# Patient Record
(demographics unavailable — no encounter records)

---

## 2024-11-04 NOTE — PHYSICAL EXAM
[Normal] : mucosa is normal [Midline] : trachea located in midline position [FreeTextEntry1] : obese [de-identified] : CI AU [de-identified] : enlarged, dry mucosa

## 2024-11-04 NOTE — HISTORY OF PRESENT ILLNESS
[de-identified] : 67 yr old female with another episode of throat clearing since mid-October, gradually improving doesn't recall dietary indiscretion   had a similar episode in May assoc with laryngitis +heartburn if she eats spicy food and lies down  +right sided epistaxis, most recently a few days ago   +DAHLIA on auto PAP 6-16cm H20  compliance report reviewed  allergy eval 6/2023 WNL

## 2024-11-04 NOTE — ASSESSMENT
[FreeTextEntry1] : NS for epistaxis  pt is using and benefitting from auto-PAP  diet/lifestyle instructions given to pt rx omeprazole if symptoms recur f/u if symptoms recur

## 2024-12-02 NOTE — PHYSICAL EXAM
[Normal] : mucosa is normal [Midline] : trachea located in midline position [FreeTextEntry1] : obese [de-identified] : CI AU [de-identified] : enlarged, dry mucosa

## 2024-12-02 NOTE — HISTORY OF PRESENT ILLNESS
[de-identified] : 67 yr old female with another episode of throat clearing since mid-October doesn't recall dietary indiscretion   had a similar episode in May assoc with laryngitis +heartburn if she eats spicy food and lies down 11/4 rx omeprazole qd some improvement in symptoms since then

## 2025-02-03 NOTE — PHYSICAL EXAM
[Normal] : mucosa is normal [Midline] : trachea located in midline position [FreeTextEntry1] : obese [de-identified] : CI AU [de-identified] : enlarged, dry mucosa

## 2025-02-03 NOTE — HISTORY OF PRESENT ILLNESS
[de-identified] : 68 yr old female with another episode of throat clearing since mid-October doesn't recall dietary indiscretion   had a similar episode in May assoc with laryngitis +heartburn if she eats spicy food and lies down 11/4 rx omeprazole qd and famotidine qhs some improvement in symptoms since then with much less throat clearing  is trying to be careful about diet/lifestyle still has a cup of coffee in the morning and a glass of wine when out for dinner

## 2025-04-01 NOTE — ADDENDUM
[FreeTextEntry1] :  Documented by Kam Gillespie acting as scribe for Dr. Montes De Oca on 04/01/2025. All Medical record entries made by the Scribe were at my, Dr. Montes De Oca, direction and personally dictated by me on 04/01/2025 . I have reviewed the chart and agree that the record accurately reflects my personal performance of the history, physical exam, assessment and plan. I have also personally directed, reviewed, and agreed with the discharge instructions.

## 2025-04-01 NOTE — HISTORY OF PRESENT ILLNESS
[de-identified] : Patient with h/o DAHLIA- uses CPAP, right-sided epistaxis, chronic rhinitis, chronic bilateral OE, vertigo, and GERD presents today for a follow up. She states that she has a sore in her right nose intermittently. She states that she had improvement in her throat clearing with Famotidine BID and a reflux diet. She states that she lost her voice in the past from the throat clearing. She states that she has been using her CPAP machine.

## 2025-04-01 NOTE — CONSULT LETTER
[Dear  ___] : Dear  [unfilled], [Courtesy Letter:] : I had the pleasure of seeing your patient, [unfilled], in my office today. [Please see my note below.] : Please see my note below. [Consult Closing:] : Thank you very much for allowing me to participate in the care of this patient.  If you have any questions, please do not hesitate to contact me. [Sincerely,] : Sincerely, [FreeTextEntry3] :  Adrian Montes De Oca MD FACS

## 2025-04-01 NOTE — PHYSICAL EXAM
[Hearing [place] Test (Tuning Fork On Forehead)] : no lateralization of tone [] : septum deviated bilaterally [Midline] : trachea located in midline position [Normal] : no rashes [Hearing Loss Right Only] : normal [Hearing Loss Left Only] : normal [FreeTextEntry8] : cerumen removed via curettage [FreeTextEntry9] : cerumen removed via curettage [de-identified] : mildly inflamed turbinates [de-identified] : slight oral PND. type 3 oral cavity. erythema of the posterior pharyngeal wall [FreeTextEntry2] : sinuses nontender to percussion. sensations intact

## 2025-04-01 NOTE — PROCEDURE
[Recalcitrant Symptoms] : recalcitrant symptoms  [Flexible Endoscope] : examined with the flexible endoscope [de-identified] : Dr. Montes De Oca [de-identified] : Chronic rhinitis [FreeTextEntry6] :  Procedure: Flexible Nasal Endoscopy with a Look at the Larynx: Risks, benefits, and alternatives of flexible endoscopy were explained to the patient. The patient gave oral consent to proceed. The flexible scope was inserted into the right nasal cavity. Endoscopy of the inferior and middle meatus was performed. No polyp, mass, or lesion was appreciated on the left, but raised red lesion in the floor of the right nose. Olfactory cleft was clear. Spheno-ethmoid recess clear. Nasopharynx was clear. Turbinates were without mass, but large right middle and inferior turbinate. There was a minimally deviated septum. Oropharyngeal walls were symmetric and mobile without lesion, mass, or edema. Hypopharynx was also without lesion or edema. Larynx was mobile without lesions. Supraglottic structures were free of mass and asymmetry, but edema of the postcricoid, arytenoids and interarytenoids. True vocal folds were white without mass or lesion, but the right vocal cord is thicker than the left vocal cord. Base of tongue was within normal limits. -large right middle and inferior turbinate -no polyps, growths, or lesions -minimally deviated septum -raised red lesion in the floor of the right nose -edema of the postcricoid, arytenoids and interarytenoids -right vocal cord is thicker than the left vocal cord

## 2025-04-01 NOTE — ASSESSMENT
[FreeTextEntry1] : Reviewed and reconciled medications, allergies, PMHx, PSHx, SocHx, FMHx.  Patient with h/o DAHLIA- uses CPAP, right-sided epistaxis, chronic rhinitis, chronic bilateral OE, vertigo, and GERD presents today for a follow up. She states that she has a sore in her right nose intermittently. She states that she had improvement in her throat clearing with Famotidine BID and a reflux diet. She states that she lost her voice in the past from the throat clearing. She states that she has been using her CPAP machine.  Physical exam: -NOSE score 10 -TNSS 1 -right ear canal: cerumen removed via curettage -left ear canal: cerumen removed via curettage -no lateralization to tuning forks -minimally deviated septum bilaterally -mildly inflamed turbinates -slight oral PND -type 3 oral cavity -erythema of the posterior pharyngeal wall  ENT Procedure: Flexible Nasal Endoscopy with a Look at the Larynx -large right middle and inferior turbinate -no polyps, growths, or lesions -minimally deviated septum -raised red lesion in the floor of the right nose -edema of the postcricoid, arytenoids and interarytenoids -right vocal cord is thicker than the left vocal cord  Plan: -Continue Reflux diet- no eating 2 hours before bed -Continue Famotidine BID 30 minutes after other medications and 30 minutes before morning meal and again at bedtime -Ordered CT Maxillofacial for potential surgical planning -FU with results from CT scan

## 2025-04-29 NOTE — HISTORY OF PRESENT ILLNESS
[de-identified] : Patient with h/o DAHLIA- uses CPAP, right-sided epistaxis, chronic rhinitis, chronic bilateral OE, vertigo, intranasal lesion, laryngeal edema, and GERD presents today for a follow up and to discuss results from CT scan. She states that she still gets right-sided epistaxis occasionally.

## 2025-04-29 NOTE — ASSESSMENT
[FreeTextEntry1] :  Reviewed and reconciled medications, allergies, PMHx, PSHx, SocHx, FMHx.  Patient with h/o DAHLIA- uses CPAP, right-sided epistaxis, chronic rhinitis, chronic bilateral OE, vertigo, intranasal lesion, laryngeal edema, and GERD presents today for a follow up and to discuss results from CT scan. She states that she still gets right-sided epistaxis occasionally.  Compliance Report 3/30/25-4/25/25: -100 complaint -averages about 7 hours per night -AHI 2.2  CT Maxillofacial 4/4/25: -Facial bones/Maxilla: Right upper molar periapical lucencies -Mandible: Two left-sided dental implants are in place. Otherwise intact -Sinonasal cavities: No polypoid mass or abnormal enhancement is seen within the right nasal cavity or nasal region. There is a soft tissue focus within the anterior left central nasal cavity just posterior to the piriform aperture (3:90, 4:41) measuring 1.9 x 0.2 x 0.9 cm. The soft tissue is inseparable from the nasal septum and inferior turbinate -Mild ethmoid mucosal thickening  ENT Procedure: Rigid nasal endoscopy followed by Flexible nasal endoscopy -mucus intranasally on the left -deviated septum left -raised vascular area along the floor at the level of the anterior portion of the right inferior turbinate -polypoid degeneration of the left inferior turbinate  Plan: CT scan reviewed and discussed with patient. Compliance report reviewed - Patient is compliant and receiving significant improvement in sleep apnea with the use of a CPAP machine, which they need to continue using. -FU in 3-4 months

## 2025-04-29 NOTE — PROCEDURE
[Recalcitrant Symptoms] : recalcitrant symptoms  [de-identified] : Dr. Montes De Oca [de-identified] : Right-sided epistaxis, Chronic rhinitis, Intranasal lesion [FreeTextEntry6] :  Procedure: Rigid Nasal Endoscopy: Risks, benefits, and alternatives of rigid endoscopy were explained to the patient. The patient gave oral consent to proceed. The rigid scope was inserted into the right nasal cavity. Endoscopy of the inferior and middle meatus was performed. No polyp, mass, or lesion was appreciated. Olfactory cleft was clear. Spheno-ethmoid recess clear. Nasopharynx was clear. Turbinates were without mass. There was a deviated septum left. The procedure was repeated on the contralateral side with similar findings. -mucus intranasally on the left -deviated septum left   Procedure: Flexible Nasal Endoscopy: Risks, benefits, and alternatives of flexible endoscopy were explained to the patient. The patient gave oral consent to proceed. The flexible scope was inserted into the right nasal cavity. Endoscopy of the inferior and middle meatus was performed. There was a raised vascular area along the floor at the level of the anterior portion of the right inferior turbinate. Olfactory cleft was clear. Spheno-ethmoid recess is clear. Nasopharynx was clear. Turbinates were without mass, but there was polypoid degeneration of the left inferior turbinate. The procedure was repeated on the contralateral side with similar findings. -raised vascular area along the floor at the level of the anterior portion of the right inferior turbinate -polypoid degeneration of the left inferior turbinate

## 2025-04-29 NOTE — ADDENDUM
[FreeTextEntry1] :  Documented by Kam Gillespie acting as scribe for Dr. Montes De Oca on 04/29/2025. All Medical record entries made by the Scribe were at my, Dr. Montes De Oca, direction and personally dictated by me on 04/29/2025 . I have reviewed the chart and agree that the record accurately reflects my personal performance of the history, physical exam, assessment and plan. I have also personally directed, reviewed, and agreed with the discharge instructions.

## 2025-04-29 NOTE — PROCEDURE
[Recalcitrant Symptoms] : recalcitrant symptoms  [de-identified] : Dr. Montes De Oca [de-identified] : Right-sided epistaxis, Chronic rhinitis, Intranasal lesion [FreeTextEntry6] :  Procedure: Rigid Nasal Endoscopy: Risks, benefits, and alternatives of rigid endoscopy were explained to the patient. The patient gave oral consent to proceed. The rigid scope was inserted into the right nasal cavity. Endoscopy of the inferior and middle meatus was performed. No polyp, mass, or lesion was appreciated. Olfactory cleft was clear. Spheno-ethmoid recess clear. Nasopharynx was clear. Turbinates were without mass. There was a deviated septum left. The procedure was repeated on the contralateral side with similar findings. -mucus intranasally on the left -deviated septum left   Procedure: Flexible Nasal Endoscopy: Risks, benefits, and alternatives of flexible endoscopy were explained to the patient. The patient gave oral consent to proceed. The flexible scope was inserted into the right nasal cavity. Endoscopy of the inferior and middle meatus was performed. There was a raised vascular area along the floor at the level of the anterior portion of the right inferior turbinate. Olfactory cleft was clear. Spheno-ethmoid recess is clear. Nasopharynx was clear. Turbinates were without mass, but there was polypoid degeneration of the left inferior turbinate. The procedure was repeated on the contralateral side with similar findings. -raised vascular area along the floor at the level of the anterior portion of the right inferior turbinate -polypoid degeneration of the left inferior turbinate

## 2025-04-29 NOTE — HISTORY OF PRESENT ILLNESS
[de-identified] : Patient with h/o DAHLIA- uses CPAP, right-sided epistaxis, chronic rhinitis, chronic bilateral OE, vertigo, intranasal lesion, laryngeal edema, and GERD presents today for a follow up and to discuss results from CT scan. She states that she still gets right-sided epistaxis occasionally.

## 2025-04-29 NOTE — DATA REVIEWED
[de-identified] : CT Maxillofacial 4/4/25: -Facial bones/Maxilla: Right upper molar periapical lucencies -Mandible: Two left-sided dental implants are in place. Otherwise intact -Sinonasal cavities: No polypoid mass or abnormal enhancement is seen within the right nasal cavity or nasal region. There is a soft tissue focus within the anterior left central nasal cavity just posterior to the piriform aperture (3:90, 4:41) measuring 1.9 x 0.2 x 0.9 cm. The soft tissue is inseparable from the nasal septum and inferior turbinate -Mild ethmoid mucosal thickening [de-identified] : Compliance Report 3/30/25-4/25/25: -100 complaint -averages about 7 hours per night -AHI 2.2

## 2025-04-29 NOTE — DATA REVIEWED
[de-identified] : CT Maxillofacial 4/4/25: -Facial bones/Maxilla: Right upper molar periapical lucencies -Mandible: Two left-sided dental implants are in place. Otherwise intact -Sinonasal cavities: No polypoid mass or abnormal enhancement is seen within the right nasal cavity or nasal region. There is a soft tissue focus within the anterior left central nasal cavity just posterior to the piriform aperture (3:90, 4:41) measuring 1.9 x 0.2 x 0.9 cm. The soft tissue is inseparable from the nasal septum and inferior turbinate -Mild ethmoid mucosal thickening [de-identified] : Compliance Report 3/30/25-4/25/25: -100 complaint -averages about 7 hours per night -AHI 2.2